# Patient Record
Sex: MALE | Race: OTHER | HISPANIC OR LATINO | URBAN - METROPOLITAN AREA
[De-identification: names, ages, dates, MRNs, and addresses within clinical notes are randomized per-mention and may not be internally consistent; named-entity substitution may affect disease eponyms.]

---

## 2021-02-06 ENCOUNTER — EMERGENCY (EMERGENCY)
Facility: HOSPITAL | Age: 56
LOS: 1 days | Discharge: HOME | End: 2021-02-06
Attending: EMERGENCY MEDICINE
Payer: SELF-PAY

## 2021-02-06 VITALS
HEART RATE: 96 BPM | TEMPERATURE: 100 F | WEIGHT: 164.91 LBS | OXYGEN SATURATION: 96 % | RESPIRATION RATE: 20 BRPM | DIASTOLIC BLOOD PRESSURE: 87 MMHG | SYSTOLIC BLOOD PRESSURE: 158 MMHG

## 2021-02-06 VITALS — RESPIRATION RATE: 12 BRPM | HEART RATE: 86 BPM

## 2021-02-06 PROCEDURE — 99283 EMERGENCY DEPT VISIT LOW MDM: CPT | Mod: 25

## 2021-02-06 PROCEDURE — 12011 RPR F/E/E/N/L/M 2.5 CM/<: CPT

## 2021-02-06 PROCEDURE — 90471 IMMUNIZATION ADMIN: CPT

## 2021-02-06 PROCEDURE — 90715 TDAP VACCINE 7 YRS/> IM: CPT

## 2021-02-06 RX ORDER — TETANUS TOXOID, REDUCED DIPHTHERIA TOXOID AND ACELLULAR PERTUSSIS VACCINE, ADSORBED 5; 2.5; 8; 8; 2.5 [IU]/.5ML; [IU]/.5ML; UG/.5ML; UG/.5ML; UG/.5ML
0.5 SUSPENSION INTRAMUSCULAR ONCE
Refills: 0 | Status: COMPLETED | OUTPATIENT
Start: 2021-02-06 | End: 2021-02-06

## 2021-02-06 RX ORDER — ACETAMINOPHEN 500 MG
975 TABLET ORAL ONCE
Refills: 0 | Status: COMPLETED | OUTPATIENT
Start: 2021-02-06 | End: 2021-02-06

## 2021-02-06 RX ADMIN — TETANUS TOXOID, REDUCED DIPHTHERIA TOXOID AND ACELLULAR PERTUSSIS VACCINE, ADSORBED 0.5 MILLILITER(S): 5; 2.5; 8; 8; 2.5 SUSPENSION INTRAMUSCULAR at 10:05

## 2021-02-06 RX ADMIN — Medication 975 MILLIGRAM(S): at 10:05

## 2021-02-06 NOTE — ED ADULT NURSE NOTE - NSIMPLEMENTINTERV_GEN_ALL_ED
Implemented All Universal Safety Interventions:  [place] to call system. Call bell, personal items and telephone within reach. Instruct patient to call for assistance. Room bathroom lighting operational. Non-slip footwear when patient is off stretcher. Physically safe environment: no spills, clutter or unnecessary equipment. Stretcher in lowest position, wheels locked, appropriate side rails in place.

## 2021-02-06 NOTE — ED PROVIDER NOTE - PATIENT PORTAL LINK FT
You can access the FollowMyHealth Patient Portal offered by [facility] by registering at the following website: [email or web address]. By joining [facility]’s FollowMyHealth portal, you will also be able to view your health information using other applications (apps) compatible with our system.

## 2021-02-06 NOTE — ED ADULT TRIAGE NOTE - CHIEF COMPLAINT QUOTE
left eyebrow laceration left eyebrow laceration, "hose hit my head", no LOC, denies blurry vision. As per  Brittany [id number]

## 2021-02-06 NOTE — ED PROCEDURE NOTE - PROCEDURE ADDITIONAL DETAILS
2 cm linear superficial laceration L eyebrow, no FB, no depression/crepitus    - anesthesia lidocaine/epi 1ml, copious irrigation NS, explored to base in bloodless field   - FAPG 5-0 running suture w/ great approximation    Douglas Sam MD

## 2021-02-06 NOTE — ED ADULT NURSE NOTE - OBJECTIVE STATEMENT
56 yo male presents to ED from work 56 yo male presents to ED from work c/o L eyebrow laceration after being hit with hose. Patient denies LOC/eye trauma, no vision changes. Patient does not know date of last tetanus shot. Bleeding controlled upon arrival. MD at bedside.

## 2021-02-06 NOTE — ED PROVIDER NOTE - NSFOLLOWUPINSTRUCTIONS_ED_ALL_ED_FT
Consulte a patrick médico de atención primaria esta semana según sea necesario para el seguimiento; llame para discutir.    Las suturas son absorbibles y se disuelven y no es necesario quitarlas.    Consulte la información sobre [facility] y las instrucciones de devolución.    Busque atención médica inmediata en ever de síntomas o preocupaciones nuevos o que empeoren.

## 2021-02-06 NOTE — ED PROVIDER NOTE - PHYSICAL EXAMINATION
Well Appearing, Nontoxic, NAD;  Symm Facies apart linear superficial laceration lateral L eyebrow (no FB, crepitus/depression), PERRL 3mm, (-)ocular trauma, MMM;  AOX3, Normal speech, CN grossly intact, normal strength/sensation/gait

## 2021-02-06 NOTE — ED PROVIDER NOTE - CLINICAL SUMMARY MEDICAL DECISION MAKING FREE TEXT BOX
------------ATTENDING NOTE------------  healthy male c/o accidental hit in L eyebrow w/ hose, low mechanism, no LOC, has superficial linear laceration to L eyebrow, no eye trauma/complaints, repaired, no additional complaints, in dept dw pt about ddx, tx, yanez, continued close reassessments.  - Douglas Sam MD    ---------------------------------------------

## 2024-11-04 NOTE — ED PROVIDER NOTE - HIV OFFER
Jules Reynolds is seen today at the request of Pito Davis MD    Patient stated experiencing ringing in both ears which started occurring approximately one year ago. He noted starting chemotherapy approximately one year and six months ago. 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     Patient denied hearing difficulty, ear pain, ear pressure, ear drainage, dizziness, a history of ear infections, a history of ear surgery, and a family history of hearing loss.       AUDIOMETRIC RESULTS  OBJECTIVE:  Otoscopic Evaluation:  Examination revealed clear ear canals and visible tympanic membranes bilaterally.     Audiogram:  Normal hearing sensitivity 250-8000 Hz bilaterally.     Speech Audiometry:  Speech Reception Thresholds:  0 dB HL right ear and 0 dB HL left ear. PTAs were in good agreement with SRTs in both ears.  Word Recognition Test Scores:   100% right ear when presented at 55 dB HL in quiet and 100% left ear when presented at 55 dB HL in quiet.      Acoustic Immittance:  Right ear:  Did not test due to absence of relevant symptoms     Left ear:  Did not test due to absence of relevant symptoms     IMPRESSIONS:  1. Tinnitus of both ears          RECOMMENDATIONS:  These results were reviewed with the patient and will be forwarded to Pito Davis MD    The patient indicated understanding of the diagnosis and was encouraged to contact our department with any questions or concerns.   Opt out